# Patient Record
Sex: MALE | Race: WHITE | URBAN - METROPOLITAN AREA
[De-identification: names, ages, dates, MRNs, and addresses within clinical notes are randomized per-mention and may not be internally consistent; named-entity substitution may affect disease eponyms.]

---

## 2017-03-26 ENCOUNTER — HOSPITAL ENCOUNTER (EMERGENCY)
Facility: CLINIC | Age: 27
Discharge: HOME | End: 2017-03-26
Attending: EMERGENCY MEDICINE | Admitting: EMERGENCY MEDICINE
Payer: OTHER GOVERNMENT

## 2017-03-26 VITALS
OXYGEN SATURATION: 98 % | DIASTOLIC BLOOD PRESSURE: 110 MMHG | HEART RATE: 68 BPM | TEMPERATURE: 98.4 F | RESPIRATION RATE: 16 BRPM | SYSTOLIC BLOOD PRESSURE: 167 MMHG | WEIGHT: 220 LBS | BODY MASS INDEX: 26.78 KG/M2

## 2017-03-26 DIAGNOSIS — K02.9 DENTAL CARIES: ICD-10-CM

## 2017-03-26 DIAGNOSIS — K08.89 PAIN, DENTAL: ICD-10-CM

## 2017-03-26 PROCEDURE — 99283 EMERGENCY DEPT VISIT LOW MDM: CPT | Mod: 25 | Performed by: EMERGENCY MEDICINE

## 2017-03-26 PROCEDURE — 99282 EMERGENCY DEPT VISIT SF MDM: CPT | Mod: 25

## 2017-03-26 PROCEDURE — 64400 NJX AA&/STRD TRIGEMINAL NRV: CPT | Performed by: EMERGENCY MEDICINE

## 2017-03-26 PROCEDURE — 64400 NJX AA&/STRD TRIGEMINAL NRV: CPT

## 2017-03-26 RX ORDER — INDOMETHACIN 50 MG/1
50 CAPSULE ORAL
Qty: 30 CAPSULE | Refills: 0 | Status: SHIPPED | OUTPATIENT
Start: 2017-03-26 | End: 2018-02-01

## 2017-03-26 NOTE — ED AVS SNAPSHOT
[facility]    [address] [address]    Phone:  [phone]    Fax:  [phone]                                       Jasper Rios   MRN: [id number]    Department:  [facility]   Date of Visit:  3/26/2017           After Visit Summary Signature Page     I have received my discharge instructions, and my questions have been answered. I have discussed any challenges I see with this plan with the nurse or doctor.    ..........................................................................................................................................  Patient/Patient Representative Signature      ..........................................................................................................................................  Patient Representative Print Name and Relationship to Patient    ..................................................               ................................................  Date                                            Time    ..........................................................................................................................................  Reviewed by Signature/Title    ...................................................              ..............................................  Date                                                            Time

## 2017-03-26 NOTE — ED AVS SNAPSHOT
[facility]    [address] [address]    Phone:  [phone]    Fax:  [phone]                                       Jasper Rios   MRN: [id number]    Department:  [facility]   Date of Visit:  3/26/2017           Patient Information     Date Of Birth          1990        Your diagnoses for this visit were:     Pain, dental     Dental caries        You were seen by Saravanan Garcia MD.      Follow-up Information     Follow up with Lynda Lagos APRN CNP.    Specialty:  Nurse Practitioner    Why:  As needed    Contact information:    [facility]  [address] [address]  [phone]          Discharge Instructions       Many of these clinics offer a sliding fee option for patients that qualify, and see patients on a walk-in or same day basis. Please call each clinic directly. As services, hours, fees and policies vary greatly.          [facility]  [phone]  Sees no insurance  [facility], [place]  [phone]  Preventive services only  [facility] (mult loc) [phone]  [facility]    ([facility]  [phone]  [facility]       [phone]  Preventive services only  [facility]  [phone]  Accepts MA & sees no ins  [facility],      Accepts MA & sees no ins   [place]   [phone]; [phone]  [facility]   Accepts MA & sees no ins       [phone]  [facility]  [phone]   Accepts MA emergencies  [facility] [phone]  [facility],     Accepts MA   [place]   [phone]    [facility] [phone]  Accepts MA & sees no ins   [facility]   Dental Clinic    [phone]  [facility]  [phone]    "Community Clinic [phone]  [facility]  Preventive services only   [place]   [phone]  [facility] [phone]  [facility], [place]  [phone]  Same day [facility] [phone]  Same day [facility],      Same day [facility]   [phone]    [facility], [facility] [phone]  Free clinic, walk-in only  [facility] (multiple locations) [phone]      [facility] [phone]    [facility] [phone]  Free [facility], walk-in only  [facility]  [phone]  [facility] [phone] (adults)       [phone] (children)  [facility] [phone]    Also, referral service for low cost dental and healthcare: [phone]  And [phone]-Dentist            Dental Cavity    A dental cavity is a pit or crater in the surface of a tooth. This exposes the sensitive inner layer of the tooth and causes pain. If the cavity isn t treated, it will get bigger. It may cause an infection or abscess in the root of the tooth. An infection in the tooth is a much more serious problem than a cavity. You might need a root canal or the entire tooth taken out.  The pain in your tooth may be made worse by drinking hot or cold beverages. It may spread from the tooth to your ear or the area of your jaw on the same side.  Home care  Follow these tips when caring for yourself at home:    Avoid hot and cold foods and drinks. Your tooth may be sensitive to changes in temperature.    If your tooth is chipped or cracked, or if there is a large open cavity, put oil of cloves directly on the tooth to relieve pain. You can buy oil of cloves at drugstores. Some pharmacies carry an over-the-counter \"toothache kit.\" This contains a paste that you can put on the exposed tooth to make it less " sensitive.    Put a cold pack on your jaw over the sore area to help reduce pain.    You may use acetaminophen or ibuprofen to ease pain, unless another medicine was prescribed. Note: If you have chronic liver or kidney disease, talk with your health care provider before using these medicines. Also talk with your provider if you ve had a stomach ulcer or GI bleeding.    If you have signs of an infection, you will be given an antibiotic. Take it as directed.  Follow-up care  Follow up with your dentist as advised. Your pain may go away with the treatment given today. But only a dentist can fully look at and treat this problem to prevent further tooth damage.  When to seek medical advice  Call your health care provider right away if any of these occur:    Redness or swelling of the face    Pain gets worse or spreads to your neck    Fever over 100.5  F (38 C)    Unusual drowsiness    Headache or stiff neck    Weakness or fainting    Pus drains from the tooth or gum    Difficulty swallowing or breathing       1048-9170 [employer]. [address] [place] [address]. All rights reserved. This information is not intended as a substitute for professional medical care. Always follow your healthcare professional's instructions.          24 Hour Appointment Hotline       To make an appointment at any [facility] clinic, call [phone] ([phone]). If you don't have a family doctor or clinic, we will help you find one. [facility] clinics are conveniently located to serve the needs of you and your family.             Review of your medicines      START taking        Dose / Directions Last dose taken    indomethacin 50 MG capsule   Commonly known as:  INDOCIN   Dose:  50 mg   Quantity:  30 capsule        Take 1 capsule (50 mg) by mouth 3 times daily (with meals) for 10 days   Refills:  0          Our records show that you are taking the medicines listed below. If these are incorrect, please call your  "family doctor or clinic.        Dose / Directions Last dose taken    cyclobenzaprine 10 MG tablet   Commonly known as:  FLEXERIL   Dose:  5-10 mg   Quantity:  30 tablet        Take 0.5-1 tablets (5-10 mg) by mouth 3 times daily as needed for muscle spasms   Refills:  1        ibuprofen 200 MG tablet   Commonly known as:  ADVIL/MOTRIN   Dose:  600 mg   Quantity:  100 tablet        Take 3 tablets (600 mg) by mouth every 4 hours as needed for mild pain or pain   Refills:  0        NO ACTIVE MEDICATIONS        .   Refills:  0        TYLENOL 325 MG tablet   Dose:  650 mg   Quantity:  100 tablet   Generic drug:  acetaminophen        Take 2 tablets (650 mg) by mouth every 6 hours as needed for mild pain   Refills:  0                Prescriptions were sent or printed at these locations (1 Prescription)                   [facility] [place] [id number] [facility]   [address] [address]    Telephone:  [phone]   Fax:  [phone]   Hours:                  E-Prescribed (1 of 1)         indomethacin (INDOCIN) 50 MG capsule                Orders Needing Specimen Collection     None      Pending Results     No orders found from 3/24/2017 to 3/27/2017.            Pending Culture Results     No orders found from 3/24/2017 to 3/27/2017.             Test Results from your hospital stay            Thank you for choosing [facility]       Thank you for choosing [facility] for your care. Our goal is always to provide you with excellent care. Hearing back from our patients is one way we can continue to improve our services. Please take a few minutes to complete the written survey that you may receive in the mail after you visit with us. Thank you!        [employer] Information     [employer] lets you send messages to your doctor, view your test results, renew your prescriptions, schedule appointments and more. To sign up, go to [email or web address] . Click on \"Log in\" on the left side of the screen, which " "will take you to the Welcome page. Then click on \"Sign up Now\" on the right side of the page.     You will be asked to enter the access code listed below, as well as some personal information. Please follow the directions to create your username and password.     Your access code is: [email or web address]  Expires: 2017 10:33 PM     Your access code will  in 90 days. If you need help or a new code, please call your [place] clinic or [phone].        Care EveryWhere ID     This is your Care EveryWhere ID. This could be used by other organizations to access your [facility] medical records  [id number]        After Visit Summary       This is your record. Keep this with you and show to your community pharmacist(s) and doctor(s) at your next visit.                  "

## 2017-03-27 NOTE — ED NOTES
C/o right side upper and lower molar pain. Slight right facial swelling.  Stated both back molars fractured.  Was supposed to have removed before leaving  but didn't happen.  Has been trying to wait until April first when insurance is available but pain getting worse

## 2017-03-27 NOTE — DISCHARGE INSTRUCTIONS
Many of these clinics offer a sliding fee option for patients that qualify, and see patients on a walk-in or same day basis. Please call each clinic directly. As services, hours, fees and policies vary greatly.          [facility], hospitals  [phone]  Sees no insurance  [facility], [place]  [phone]  Preventive services only  [facility] (mult loc) [phone]  [facility]    ([facility]), hospitals  [phone]  [facility], [place]       [phone]  Preventive services only  [facility]  [phone]  Accepts MA & sees no ins  [facility],      Accepts MA & sees no ins   [place]   [phone]; [phone]  [facility], [facility]   Accepts MA & sees no ins       [phone]  [facility], hospitals  [phone]   Accepts MA emergencies  [facility], [place] [phone]  [facility],     Accepts MA   [place]   [phone]    [facility] [phone]  Accepts MA & sees no ins   [facility]   Dental Clinic    [phone]  [facility], hospitals  [phone]   Critical access hospital [phone]  [facility]  Preventive services only   [place]   [phone]  [facility] and [facility] [phone]  [facility], [place]  [phone]  Same day [facility] [phone]  Same day [facility],      Same day [facility]   [phone]    [facility], hospitals [phone]  Free [facility], walk-in only  [facility] (multiple locations) [phone]      [facility] , hospitals [phone]    [facility] [phone]  Free clinic, walk-in only  [place] [facility]  [phone]  [facility] [phone] (adults)       [phone]  "(children)  [place] [facility], [place] [phone]    Also, referral service for low cost dental and healthcare: [phone]  And 1-787-Dentist            Dental Cavity    A dental cavity is a pit or crater in the surface of a tooth. This exposes the sensitive inner layer of the tooth and causes pain. If the cavity isn t treated, it will get bigger. It may cause an infection or abscess in the root of the tooth. An infection in the tooth is a much more serious problem than a cavity. You might need a root canal or the entire tooth taken out.  The pain in your tooth may be made worse by drinking hot or cold beverages. It may spread from the tooth to your ear or the area of your jaw on the same side.  Home care  Follow these tips when caring for yourself at home:    Avoid hot and cold foods and drinks. Your tooth may be sensitive to changes in temperature.    If your tooth is chipped or cracked, or if there is a large open cavity, put oil of cloves directly on the tooth to relieve pain. You can buy oil of cloves at [employer]. Some pharmacies carry an over-the-counter \"toothache kit.\" This contains a paste that you can put on the exposed tooth to make it less sensitive.    Put a cold pack on your jaw over the sore area to help reduce pain.    You may use acetaminophen or ibuprofen to ease pain, unless another medicine was prescribed. Note: If you have chronic liver or kidney disease, talk with your health care provider before using these medicines. Also talk with your provider if you ve had a stomach ulcer or GI bleeding.    If you have signs of an infection, you will be given an antibiotic. Take it as directed.  Follow-up care  Follow up with your dentist as advised. Your pain may go away with the treatment given today. But only a dentist can fully look at and treat this problem to prevent further tooth damage.  When to seek medical advice  Call your health care provider right away if any of these " occur:    Redness or swelling of the face    Pain gets worse or spreads to your neck    Fever over 100.5  F (38 C)    Unusual drowsiness    Headache or stiff neck    Weakness or fainting    Pus drains from the tooth or gum    Difficulty swallowing or breathing       2262-3982 [employer]. [address] [place] [address]. All rights reserved. This information is not intended as a substitute for professional medical care. Always follow your healthcare professional's instructions.

## 2017-03-27 NOTE — ED PROVIDER NOTES
History     Chief Complaint   Patient presents with     Dental Pain     states he chipped a tooth. now painful     HPI  Jasper Rios is a 26 year old male who presents with right upper dental pain.  Patient states he's had long-standing issues with dental caries and dental pain.  States he is supposed to have his teeth fixed before he left the  but that never happened.  He states his dental insurance does not begin until April 1 and he does have a dentist he can see thereafter.  He presents today with increasing pain.  Denies fever or chills.  No facial swelling.  No difficulty speech or swallowing.  No treatment prior to arrival.  Immunizations are up-to-date.    I have reviewed the Medications, Allergies, Past Medical and Surgical History, and Social History in the Epic system.    Review of Systems all other systems were reviewed and are negative.  Past Medical History:   Diagnosis Date     Lumbago      Overweight(278.02)      Unspecified conductive hearing loss      Unspecified otitis media     recurrent     Patient Active Problem List   Diagnosis     MASS IN CHEST     Attention deficit disorder     CARDIOVASCULAR SCREENING; LDL GOAL LESS THAN 160     No current facility-administered medications for this encounter.      Current Outpatient Prescriptions   Medication     indomethacin (INDOCIN) 50 MG capsule     acetaminophen (TYLENOL) 325 MG tablet     ibuprofen (ADVIL,MOTRIN) 200 MG tablet     cyclobenzaprine (FLEXERIL) 10 MG tablet     NO ACTIVE MEDICATIONS        Allergies   Allergen Reactions     Penicillins      Social History     Social History     Marital status:      Spouse name: N/A     Number of children: N/A     Years of education: N/A     Occupational History     Not on file.     Social History Main Topics     Smoking status: Former Smoker     Packs/day: 0.50     Types: Cigarettes, Dip, chew, snus or snuff     Smokeless tobacco: Current User     Types: Chew     Alcohol use Yes       Comment: every other weekend     Drug use: No     Sexual activity: No     Other Topics Concern     Not on file     Social History Narrative     Family History   Problem Relation Age of Onset     Hypertension Mother      Lipids Father      DIABETES Paternal Grandmother      C.A.D. Paternal Grandfather      Respiratory Brother      Asthma Brother      GASTROINTESTINAL DISEASE Brother      chron's           Physical Exam   BP: (!) 167/110  Pulse: 68  Temp: 98.4  F (36.9  C)  Resp: 16  Weight: 99.8 kg (220 lb)  SpO2: 98 %  Physical Exam and alert cooperative male in moderate distress.  HEENT shows no obvious external facial swelling or asymmetry.  He does not have any malocclusion.  Examination of his teeth reveal multiple dental caries.  He has his #1 tooth extracted.  3 tooth is his offending tooth today.  This has been extensive caries in the medial aspect and is missing approximately one third of the tooth.  There is no erythema, fluctuance or tenderness of the gumline.    ED Course     ED Course     Procedures       patient was given a dental block of 0.5% Marcaine with epinephrine.  Total of 2 cc.       Critical Care time:  none               Labs Ordered and Resulted from Time of ED Arrival Up to the Time of Departure from the ED - No data to display    Assessments & Plan (with Medical Decision Making)   Patient is a 26-year-old male presents with worsening dental pain.  Patient has long history of dental issues and due to insurance has not had these addressed yet.  He presents today with increasing pain in the right upper #3 tooth.  On exam he has no evidence of infection or abscess.  No facial swelling.  He has extensive cavity affecting #3 was affecting approximately one third of the tooth medially.  Patient was provided a dental block.  He'll be given Indocin for additional pain.  Handout for emergency dental providers is given.  Reasons to return for reassessment discussed.  I have reviewed the nursing  notes.    I have reviewed the findings, diagnosis, plan and need for follow up with the patient.    New Prescriptions    INDOMETHACIN (INDOCIN) 50 MG CAPSULE    Take 1 capsule (50 mg) by mouth 3 times daily (with meals) for 10 days       Final diagnoses:   Pain, dental   Dental caries       3/26/2017   [facility] EMERGENCY DEPARTMENT     Saravanan Garcia MD  03/26/17 9679

## 2018-01-28 ENCOUNTER — HOSPITAL ENCOUNTER (EMERGENCY)
Facility: CLINIC | Age: 28
Discharge: HOME | End: 2018-01-28
Attending: PHYSICIAN ASSISTANT | Admitting: PHYSICIAN ASSISTANT
Payer: OTHER GOVERNMENT

## 2018-01-28 VITALS
OXYGEN SATURATION: 96 % | WEIGHT: 240 LBS | BODY MASS INDEX: 29.21 KG/M2 | HEART RATE: 103 BPM | SYSTOLIC BLOOD PRESSURE: 149 MMHG | RESPIRATION RATE: 16 BRPM | TEMPERATURE: 98 F | DIASTOLIC BLOOD PRESSURE: 83 MMHG

## 2018-01-28 DIAGNOSIS — M54.42 ACUTE MIDLINE LOW BACK PAIN WITH LEFT-SIDED SCIATICA: Primary | ICD-10-CM

## 2018-01-28 PROCEDURE — 99213 OFFICE O/P EST LOW 20 MIN: CPT | Performed by: PHYSICIAN ASSISTANT

## 2018-01-28 PROCEDURE — G0463 HOSPITAL OUTPT CLINIC VISIT: HCPCS

## 2018-01-28 RX ORDER — PREDNISONE 20 MG/1
TABLET ORAL
Qty: 10 TABLET | Refills: 0 | Status: SHIPPED | OUTPATIENT
Start: 2018-01-28 | End: 2018-03-30

## 2018-01-28 RX ORDER — HYDROCODONE BITARTRATE AND ACETAMINOPHEN 5; 325 MG/1; MG/1
1-2 TABLET ORAL EVERY 4 HOURS PRN
Qty: 15 TABLET | Refills: 0 | Status: SHIPPED | OUTPATIENT
Start: 2018-01-28 | End: 2018-02-01

## 2018-01-28 ASSESSMENT — ENCOUNTER SYMPTOMS
GASTROINTESTINAL NEGATIVE: 1
NEUROLOGICAL NEGATIVE: 1
CARDIOVASCULAR NEGATIVE: 1
CONSTITUTIONAL NEGATIVE: 1
RESPIRATORY NEGATIVE: 1
BACK PAIN: 1

## 2018-01-28 NOTE — ED AVS SNAPSHOT
[facility] Emergency Department    [address] [facility] [address]    Phone:  [phone]    Fax:  [phone]                                       Jasper Rios   MRN: [id number]    Department:  [facility] Emergency Department   Date of Visit:  1/28/2018           Patient Information     Date Of Birth          1990        Your diagnoses for this visit were:     Acute midline low back pain with left-sided sciatica        You were seen by Palak Sargent PA-C.      Follow-up Information     Follow up with Lynda Lagos APRN CNP. Call in 5 days.    Specialty:  Nurse Practitioner    Why:  As needed, For persistent symptoms    Contact information:    [address] [place] [address]  [phone]          Follow up with [facility] Emergency Department.    Specialty:  EMERGENCY MEDICINE    Why:  As needed, If symptoms worsen    Contact information:    [address] [place] [id number] [phone]    Additional information:    The medical center is located at   [address] [place] [address] ([facility] [address]   [facility] [place] miles north   of [place]).      Discharge References/Attachments     BACK PAIN W/ SCIATICA (ENGLISH)      24 Hour Appointment Hotline       To make an appointment at any [facility] clinic, call [phone]). If you don't have a family doctor or clinic, we will help you find one. [facility] clinics are conveniently located to serve the needs of you and your family.          ED Discharge Orders     PHYSICAL THERAPY REFERRAL       *This therapy referral will be filtered to a centralized scheduling office at [facility] and the patient will receive a call to schedule an appointment at a [facility] location most convenient for them. *     [facility] provides Physical Therapy evaluation and treatment and many specialty services across the [facility] system.  If requesting a  "specialty program, please choose from the list below.    If you have not heard from the scheduling office within 2 business days, please call [phone] for all locations, with the exception of [place], please call [phone].  Treatment: Evaluation & Treatment  Special Instructions/Modalities: Please evaluated and treat  Special Programs: None    Please be aware that coverage of these services is subject to the terms and limitations of your health insurance plan.  Call member services at your health plan with any benefit or coverage questions.      **Note to Provider:  If you are referring outside of [facility] for the therapy appointment, please list the name of the location in the \"special instructions\" above, print the referral and give to the patient to schedule the appointment.                     Review of your medicines      START taking        Dose / Directions Last dose taken    HYDROcodone-acetaminophen 5-325 MG per tablet   Commonly known as:  NORCO   Dose:  1-2 tablet   Quantity:  15 tablet        Take 1-2 tablets by mouth every 4 hours as needed for moderate to severe pain   Refills:  0        predniSONE 20 MG tablet   Commonly known as:  DELTASONE   Quantity:  10 tablet        Take two tablets (= 40mg) each day for 5 (five) days   Refills:  0          Our records show that you are taking the medicines listed below. If these are incorrect, please call your family doctor or clinic.        Dose / Directions Last dose taken    cyclobenzaprine 10 MG tablet   Commonly known as:  FLEXERIL   Dose:  5-10 mg   Quantity:  30 tablet        Take 0.5-1 tablets (5-10 mg) by mouth 3 times daily as needed for muscle spasms   Refills:  1        ibuprofen 200 MG tablet   Commonly known as:  ADVIL/MOTRIN   Dose:  600 mg   Quantity:  100 tablet        Take 3 tablets (600 mg) by mouth every 4 hours as needed for mild pain or pain   Refills:  0        indomethacin 50 MG capsule   Commonly known as:  INDOCIN   Dose:  50 mg "   Quantity:  30 capsule        Take 1 capsule (50 mg) by mouth 3 times daily (with meals) for 10 days   Refills:  0        NO ACTIVE MEDICATIONS        .   Refills:  0        TYLENOL 325 MG tablet   Dose:  650 mg   Quantity:  100 tablet   Generic drug:  acetaminophen        Take 2 tablets (650 mg) by mouth every 6 hours as needed for mild pain   Refills:  0                Prescriptions were sent or printed at these locations (2 Prescriptions)                   [facility] [place], [place] - [address] [facility]   [address] [facility] [address]    Telephone:  [phone]   Fax:  [phone]   Hours:                  E-Prescribed (1 of 2)         predniSONE (DELTASONE) 20 MG tablet                 Printed at Department/Unit printer (1 of 2)         HYDROcodone-acetaminophen (NORCO) 5-325 MG per tablet                Orders Needing Specimen Collection     None      Pending Results     No orders found from 1/26/2018 to 1/29/2018.            Pending Culture Results     No orders found from 1/26/2018 to 1/29/2018.            Pending Results Instructions     If you had any lab results that were not finalized at the time of your Discharge, you can call the ED Lab Result RN at [phone]. You will be contacted by this team for any positive Lab results or changes in treatment. The nurses are available 7 days a week from 10A to 6:30P.  You can leave a message 24 hours per day and they will return your call.        Test Results From Your Hospital Stay               Thank you for choosing [facility]       Thank you for choosing [facility] for your care. Our goal is always to provide you with excellent care. Hearing back from our patients is one way we can continue to improve our services. Please take a few minutes to complete the written survey that you may receive in the mail after you visit with us. Thank you!        [facility] Information     [employer] lets you send messages to your doctor, view your test results,  "renew your prescriptions, schedule appointments and more. To sign up, go to [email or web address] . Click on \"Log in\" on the left side of the screen, which will take you to the Welcome page. Then click on \"Sign up Now\" on the right side of the page.     You will be asked to enter the access code listed below, as well as some personal information. Please follow the directions to create your username and password.     Your access code is: [email or web address]  Expires: 2018  1:37 PM     Your access code will  in 90 days. If you need help or a new code, please call your [facility] clinic or [phone].        Care EveryWhere ID     This is your Care EveryWhere ID. This could be used by other organizations to access your [facility] medical records  [id number]        Equal Access to Services     CHEN MANJARREZ : Michael Tervino, jhonatan christensen, riley guzman, dex guevara . So [facility] [phone].    ATENCIÓN: Si habla español, tiene a hooker disposición servicios gratuitos de asistencia lingüística. Llame al [phone].    We comply with applicable federal civil rights laws and [place] laws. We do not discriminate on the basis of race, color, national origin, age, disability, sex, sexual orientation, or gender identity.            After Visit Summary       This is your record. Keep this with you and show to your community pharmacist(s) and doctor(s) at your next visit.                  "

## 2018-01-28 NOTE — ED PROVIDER NOTES
History     Chief Complaint   Patient presents with     Back Pain     low back pain, lifting injury, acute on chronic     HPI  Jasper Rios is a 27 year old male who presents with complaints of low back pain since yesterday.  Pt reports that he was helping his mother move yesterday and lifted a couch up a couple flights of stairs.  He did not experience any back pain until he bent over last night to get his 10 month old out of his car seat.  Pt states he developed sudden-onset pain in his mid-low back at that time.  Pain has been persistent since.  He reports some radiating paresthesias down his left leg at first but none since.  Back pain is worse with bending, twisting, and walking.  Pt reports history of similar back pain when he had a disc herniation while in the .  He has tried taking Ibuprofen at home without improvement.  Denies saddle anesthesia, bowel or bladder incontinence, lower extremity numbness/tingling/weakness.  Gait is steady but guarded.  Denies fevers, chills, nausea, vomiting, abdominal pain, urinary symptoms, or leg pain/swelling.      Problem List:    Patient Active Problem List    Diagnosis Date Noted     CARDIOVASCULAR SCREENING; LDL GOAL LESS THAN 160 02/09/2012     Priority: Medium     Attention deficit disorder 12/05/2006     Priority: Medium     Problem list name updated by automated process. Provider to review       MASS IN CHEST 04/12/2006     Priority: Medium     Enlarging growth along left border of the sternum.  Previously noted right posterior rib hump consistant with rotational scoliosis but that is not seen now and the growth is enlarging.  Minimal tenderness.          Past Medical History:    Past Medical History:   Diagnosis Date     Lumbago      Overweight(278.02)      Unspecified conductive hearing loss      Unspecified otitis media        Past Surgical History:    Past Surgical History:   Procedure Laterality Date     SURGICAL HISTORY OF -       aspiration  arthorgram left hip       Family History:    Family History   Problem Relation Age of Onset     Hypertension Mother      Lipids Father      DIABETES Paternal Grandmother      C.A.D. Paternal Grandfather      Respiratory Brother      Asthma Brother      GASTROINTESTINAL DISEASE Brother      chron's       Social History:  Marital Status:   [2]  Social History   Substance Use Topics     Smoking status: Former Smoker     Packs/day: 0.50     Types: Cigarettes, Dip, chew, snus or snuff     Smokeless tobacco: Current User     Types: Chew     Alcohol use Yes      Comment: every other weekend        Medications:      predniSONE (DELTASONE) 20 MG tablet   HYDROcodone-acetaminophen (NORCO) 5-325 MG per tablet   indomethacin (INDOCIN) 50 MG capsule   acetaminophen (TYLENOL) 325 MG tablet   ibuprofen (ADVIL,MOTRIN) 200 MG tablet   cyclobenzaprine (FLEXERIL) 10 MG tablet   NO ACTIVE MEDICATIONS         Review of Systems   Constitutional: Negative.    Respiratory: Negative.    Cardiovascular: Negative.    Gastrointestinal: Negative.    Genitourinary: Negative.    Musculoskeletal: Positive for back pain.   Skin: Negative.    Neurological: Negative.    All other systems reviewed and are negative.      Physical Exam   BP: 149/83  Pulse: 103  Temp: 98  F (36.7  C)  Resp: 16  Weight: 108.9 kg (240 lb)  SpO2: 96 %      Physical Exam   Constitutional: He appears well-developed and well-nourished. No distress.   HENT:   Head: Normocephalic and atraumatic.   Eyes: Conjunctivae are normal.   Neck: Normal range of motion. Neck supple.   Cardiovascular: Normal rate, regular rhythm and normal heart sounds.    Pulmonary/Chest: Effort normal and breath sounds normal.   Abdominal: Soft. There is no tenderness.   Musculoskeletal:        Cervical back: Normal. He exhibits normal range of motion, no tenderness and no bony tenderness.        Thoracic back: Normal. He exhibits normal range of motion, no tenderness and no bony tenderness.         Lumbar back: He exhibits decreased range of motion, tenderness and bony tenderness.   Midline lumbar tenderness.  No paraspinal muscle tenderness.  Positive SLR on the right.   Neurological: He is alert. He has normal strength and normal reflexes. No sensory deficit. Coordination and gait normal.   Skin: Skin is warm and dry. No rash noted.       ED Course     ED Course     Procedures      Assessments & Plan (with Medical Decision Making)     DDx: Acute muscle strain, muscle spasm, herniated disc, cauda equina, spinal fracture, spinal stenosis, sciatica, degenerative disease, ligamentous injury, spondylolisthesis, epidural abscess, osteomyelitis, AAA, abdominal etiologies, nephrolithiasis, pyelonephritis     Pt is a 27 year old male who presents with complaints of low back pain since yesterday.  Pt reports that he was helping his mother move yesterday and lifted a couch up a couple flights of stairs.  He did not experience any back pain until he bent over last night to get his 10 month old out of his car seat.  Pt states he developed sudden-onset pain in his mid-low back at that time.  Pain has been persistent since.  He reports some radiating paresthesias down his left leg at first but none since.  Back pain is worse with bending, twisting, and walking.  Pt reports history of similar back pain when he had a disc herniation while in the .  Pt is afebrile on arrival.  Midline low back tenderness on exam with positive SLR on the right.  No evidence of cauda equina.  Denies saddle anesthesia, bowel or bladder incontinence, lower extremity numbness/tingling/weakness.  Normal lower extremity strength.  Gait is steady.  Suspect possible disc herniation.  No indication for emergent MRI imaging today as pt has no new trauma or neurologic symptoms or objective findings of weakness or signs of cauda equina on exam.  Encouraged symptomatic treatments at home.  Physical therapy referral was made if patient does not  experience improvement.  Hand-outs were provided.    Patient was sent with Prednisone burst and Norco and was instructed to follow-up with PCP if no improvement in 5-7 days for continued care and management or sooner if new or worsening symptoms.  He is to return to the ED for persistent and/or worsening symptoms.  Patient expressed understanding of the diagnosis and plan and was discharged home in good condition.    I have reviewed the nursing notes.    I have reviewed the findings, diagnosis, plan and need for follow up with the patient.    Discharge Medication List as of 1/28/2018  1:37 PM      START taking these medications    Details   predniSONE (DELTASONE) 20 MG tablet Take two tablets (= 40mg) each day for 5 (five) days, Disp-10 tablet, R-0, E-Prescribe      HYDROcodone-acetaminophen (NORCO) 5-325 MG per tablet Take 1-2 tablets by mouth every 4 hours as needed for moderate to severe pain, Disp-15 tablet, R-0, Local Print             Final diagnoses:   Acute midline low back pain with left-sided sciatica       1/28/2018   [facility] EMERGENCY DEPARTMENT     Palak Sargent PA-C  01/28/18 4471

## 2018-01-28 NOTE — ED AVS SNAPSHOT
[facility] Emergency Department    [address] [facility] [address]    Phone:  [phone]    Fax:  [phone]                                       Jasper Rios   MRN: [id number]    Department:  [facility] Emergency Department   Date of Visit:  1/28/2018           After Visit Summary Signature Page     I have received my discharge instructions, and my questions have been answered. I have discussed any challenges I see with this plan with the nurse or doctor.    ..........................................................................................................................................  Patient/Patient Representative Signature      ..........................................................................................................................................  Patient Representative Print Name and Relationship to Patient    ..................................................               ................................................  Date                                            Time    ..........................................................................................................................................  Reviewed by Signature/Title    ...................................................              ..............................................  Date                                                            Time

## 2018-01-28 NOTE — LETTER
[facility] EMERGENCY DEPARTMENT  [address] [facility] [address]  Phone: [phone]  Fax: [phone]    January 29, 2018        Jasper Rios  [address] [facility] [address]          To whom it may concern:    RE: Jasper Rios    Patient was seen and treated today at our clinic.  Patient can return to work on 1/29/18 with light duty for 1 week.     Please contact me for questions or concerns.      Sincerely,      Cheryl Dockery PA-C

## 2018-01-30 ENCOUNTER — HOSPITAL ENCOUNTER (OUTPATIENT)
Dept: PHYSICAL THERAPY | Facility: CLINIC | Age: 28
Setting detail: THERAPIES SERIES
End: 2018-01-30
Attending: PHYSICIAN ASSISTANT
Payer: OTHER GOVERNMENT

## 2018-01-30 PROCEDURE — 97014 ELECTRIC STIMULATION THERAPY: CPT | Mod: GP | Performed by: PHYSICAL THERAPIST

## 2018-01-30 PROCEDURE — 97110 THERAPEUTIC EXERCISES: CPT | Mod: GP | Performed by: PHYSICAL THERAPIST

## 2018-01-30 PROCEDURE — 40000718 ZZHC STATISTIC PT DEPARTMENT ORTHO VISIT: Performed by: PHYSICAL THERAPIST

## 2018-01-30 NOTE — PROGRESS NOTES
01/30/18 0700   General Information   Type of Visit Initial OP Ortho PT Evaluation   Start of Care Date 01/30/18   Referring Physician Palak Sargent PA-C   Patient/Family Goals Statement Improve mobility, decrease pain   Orders Evaluate and Treat   Date of Order 01/28/18   Insurance Type Other  ()   Medical Diagnosis LBP w/ L sciatica   Body Part(s)   Body Part(s) Lumbar Spine/SI   Presentation and Etiology   Pertinent history of current problem (include personal factors and/or comorbidities that impact the POC) Pt was helping mom move on 1/27/18--no pain during.  Pt states later that day when he bent over to get son out of car seat which was resting on the floor his back locked up and he could not stand up.   Pt went to ER the following day.  No recent tests.  Meds: prednisone,  hydrocodone--primarily using at night.   During the day taking alleve, ibuprofen or tylenol.    Pt reports midline LBP @ best 4/10 w/ meds,  @ worst 10/10.  No radiating pain.   Pt notes intermittent numbness/tingling in L > R groin into leg and post knee.   No LE weakness.   Pt notes initially less frequent bowel / bladder.   PMHX:  slipped disc upper thoracic in .  Lumbar mm spasms intermittently.   13 broken bones (none recently).  L knee meniscectomy 2009.      Impairments A. Pain;D. Decreased ROM;E. Decreased flexibility;F. Decreased strength and endurance;H. Impaired gait;L. Tingling   Onset date of current episode/exacerbation 01/27/18   Pain quality A. Sharp;C. Aching;E. Shooting   Pain exacerbation comment Walking--having to use a cane.  Sitting tolerance 30 min.  Avoiding all lifting/ carrying.  Standing > 1 hour.  Bending--needs assistance w/ shoes/ socks.  Needs to take pain meds before bed  and wakes 2 X/night --takes 30 min to get back to sleep.  Difficulty getting out of bed takes 30 min in the morning   Pain/symptoms eased by C. Rest;F. Certain positions;G. Heat;H. Cold;I. OTC medication(s);J.  Braces/supports   Progression of symptoms since onset: Unchanged   Prior Level of Function   Functional Level Prior Comment No device w/ walking.  No limitations w/ lifting / carry.  Pt goes to gym 5X/ wk aerobic/ free wts.     Current Level of Function   Patient role/employment history A. Employed   Employment Comments calibration of equipment--travels .   Currently not doing any lifting.    Fall Risk Screen   Fall screen completed by PT   Have you fallen 2 or more times in the past year? No   Have you fallen and had an injury in the past year? No   Is patient a fall risk? No   Lumbar Spine/SI Objective Findings   Observation slow and guarded w/ mobility--including bed mobility   Posture R PSIS/ crest lower.  L LE longer supine   Gait/Locomotion Walking w/ SEC w/ mild + limp   Flexion ROM 40% leaning on cane for support then notes tightness   Extension ROM 10%   Right Side Bending ROM 40%   Left Side Bending ROM 30%   Pelvic Screen + R FB test (very limited ROM)   Hip Flexion (L2) Strength B 4-/5   Knee Flexion Strength B 5/5   Knee Extension (L3) Strength B 5/5   Ankle Dorsiflexion (L4) Strength B 5/5   Great Toe Extension (L5) Strength B 5/5   SLR B very limited motion before symptoms came on.     Slump Test B +   Segmental Mobility ERSR L5.    Palpation Mild tendenress lumbar paraspinals.     Planned Therapy Interventions   Planned Therapy Interventions manual therapy;neuromuscular re-education;ROM;strengthening;stretching   Planned Modality Interventions   Planned Modality Interventions Electrical stimulation;TENS   Clinical Impression   Criteria for Skilled Therapeutic Interventions Met yes, treatment indicated   PT Diagnosis acute LBP w/ radicular symptoms   Influenced by the following impairments pain, decreased mobility, decreased strength   Functional limitations due to impairments walking, sitting, lifting/ carrying, dressing, sleeping   Clinical Presentation Evolving/Changing   Clinical Presentation  Rationale recent onset of symptoms   Clinical Decision Making (Complexity) Moderate complexity   Therapy Frequency 2 times/Week   Predicted Duration of Therapy Intervention (days/wks) 4 weeks then 1 X / wk x 2 = 10 visits   Risk & Benefits of therapy have been explained Yes   Patient, Family & other staff in agreement with plan of care Yes   Education Assessment   Barriers to Learning No barriers   Ortho Goal 1   Goal Description 1.  Pt will be able to walk w/o device and no limp   Target Date 03/01/18   Ortho Goal 2   Goal Description 2.  Pt will be able to put on shoes /socks w/ LBP no > 3/10   Target Date 03/01/18   Ortho Goal 3   Goal Description 3.  Pt will have increased bed mobility and take no > 5 min to get out of bed in the morning   Target Date 03/21/18   Ortho Goal 4   Goal Description 4.  Pt will be able to lift/ carry 20# w/ LBP no > 3/10   Target Date 03/21/18   Ortho Goal 5   Goal Description 5.  Pt will be independent and consistent w/HEP and increased awareness of body mechanics   Target Date 03/21/18   Total Evaluation Time   Total Evaluation Time 30     Thank you for this referral,    Aydee Mora, PT,  CEAS   [id number]  [facility]  [phone]

## 2018-02-01 ENCOUNTER — OFFICE VISIT (OUTPATIENT)
Dept: FAMILY MEDICINE | Facility: CLINIC | Age: 28
End: 2018-02-01
Payer: OTHER GOVERNMENT

## 2018-02-01 VITALS
DIASTOLIC BLOOD PRESSURE: 80 MMHG | BODY MASS INDEX: 29.71 KG/M2 | RESPIRATION RATE: 14 BRPM | WEIGHT: 244 LBS | TEMPERATURE: 96.9 F | SYSTOLIC BLOOD PRESSURE: 134 MMHG | HEART RATE: 106 BPM | HEIGHT: 76 IN

## 2018-02-01 DIAGNOSIS — M54.42 ACUTE MIDLINE LOW BACK PAIN WITH LEFT-SIDED SCIATICA: Primary | ICD-10-CM

## 2018-02-01 PROCEDURE — 99213 OFFICE O/P EST LOW 20 MIN: CPT | Performed by: FAMILY MEDICINE

## 2018-02-01 RX ORDER — CYCLOBENZAPRINE HCL 10 MG
5-10 TABLET ORAL 3 TIMES DAILY PRN
Qty: 30 TABLET | Refills: 1 | Status: SHIPPED | OUTPATIENT
Start: 2018-02-01

## 2018-02-01 RX ORDER — HYDROCODONE BITARTRATE AND ACETAMINOPHEN 5; 325 MG/1; MG/1
1 TABLET ORAL EVERY 4 HOURS PRN
Qty: 30 TABLET | Refills: 0 | Status: SHIPPED | OUTPATIENT
Start: 2018-02-01 | End: 2018-03-30

## 2018-02-01 NOTE — MR AVS SNAPSHOT
"              After Visit Summary   2/1/2018    Jasper Rios    MRN: [id number]           Patient Information     Date Of Birth          1990        Visit Information        Provider Department      2/1/2018 7:00 AM Fabricio Siddiqui MD [facility]        Today's Diagnoses     Acute midline low back pain with left-sided sciatica    -  1      Care Instructions    Take Hydrocodone-Acetaminophen 5/325 mg 1 tablet orally every 4-6 hrs as needed for severe pain only.  Take cyclobenzaprine 10 mg 0.5-1 tablet orally every 8 hrs as needed for spasms.  Do not take the above medications at same time.  Ibuprofen 200 mg 2-3 tablets with food every 8 hrs as needed for pain  Do not take when driving or operating heavy equipment.    May use TENS unit as directed by therapist.  Continue physical therapy.    If constant left leg tingling, or if worsened pain after 1-2 weeks, contact your care team - possible MRI then.    Go to ER if with incontinence, urinary/bowel retention, weakness of legs or numbness of the buttocks.    * Sciatica    Sciatica (\"Lumbar Radiculopathy\") causes a pain that spreads from the lower back down into the buttock, hip and leg. Sometimes leg pain can occur without any back pain. Sciatica is due to irritation or pressure on a spinal nerve as it comes out of the spinal canal. This is most often due to a bulge or rupture of a nearby spinal disk (the cartilage cushion between each spinal bone), which presses on a nearby nerve. Other causes include spinal stenosis (narrowing of the spinal canal) and spasm of the piriformis muscle (a muscle in the buttocks that the sciatic nerve passes through).  Sciatica may begin after a sudden twisting/bending force (such as in a car accident), or sometimes after a simple awkward movement. In either case, muscle spasm is commonly present and contributes to the pain.  The diagnosis of sciatica is made from the symptoms and physical exam. Unless you " had a physical injury (such as a car accident or fall), X-rays are usually not ordered for the initial evaluation of sciatica because the nerves and disks cannot be seen on an X-ray. Most sciatica (80-90%) gets better with time.  What can I do about my low back pain?  There are three main things you can do to ease low back pain and help it go away.    Use heat or cold packs.    Take medicine as directed.    Use positions, movements and exercises. Stay active! Too much rest can make your symptoms worse.  Using heat or cold packs  Try cold packs or gentle heat to ease your pain. Use whichever gives the most relief. Apply the cold pack or heat for 15 minutes at a time, as often as needed.  Taking medicine  If taking over-the-counter medicine:    Take ibuprofen (Advil, Motrin) 600 mg. three times a day as needed for pain.  OR    Take Aleve (naproxen sodium) 220 to 440 mg. two times a day as needed for pain  If your doctor prescribed a muscle relaxant (cyclobenzapine 10 mg.):    Take one half ( ) to 1 tablet at bedtime    Do not drive when taking this medicine. This drug may make you sleepy.  Using positions, movements and exercises  Research tells us that moving your joints and muscles can help you recover from back pain. Such activity should be simple and gentle.  Use the positions below as well as walking to help relieve your discomfort. Try taking a short walk every 3 to 4 hours during the day. Walk for a few minutes inside your home or take longer walks outside, on a treadmill or at a mall. Slowly increase the amount of time you walk. Expect discomfort when you begin, but it should lessen as your back starts to recover.  Finding a position that is comfortable  When your back pain is new, you may find that certain positions will ease your pain. Gently try each of the following positions until you find one that eases your pain. Once you find a position of comfort, use it as often as you like while you recover. Return  to your daily routine as soon as possible.     Lie on your back with your legs bent. You can do this by placing a pillow under your knees or lie on the floor and rest your lower legs on the seat of a chair.    Lie on your side with your knees bent and place a pillow between your knees.    Lie on your stomach over pillows.  When should I call my doctor?  Your back pain should improve over the first couple of weeks. As it improves, you should be able to return to your normal activities. But call your doctor if:    You have a sudden change in your ability to control? your bladder or bowels.    You begin to feel tingling in your groin or legs.    The pain spreads down your leg and into your foot.    Your toes, feet or leg muscles begin to feel weak.    You feel generally unwell or sick.    Your pain gets worse.    3513-5737 [employer]. [address] [place] [address]. All rights reserved. This information is not intended as a substitute for professional medical care. Always follow your healthcare professional's instructions.  This information has been modified by your health care provider with permission from the publisher.                Follow-ups after your visit        Your next 10 appointments already scheduled     Feb 02, 2018  3:00 PM CST   Ortho Treatment with Aydee Mora PT   [facility] ([facility])    [address] [address]   [phone]            Feb 06, 2018  7:00 AM CST   Ortho Treatment with Aydee Mora PT   [facility])    [address] [address]   [phone]            Feb 09, 2018  7:00 AM CST   Ortho Treatment with Aydee Mora PT   [facility])    [address] [address]  [facility] [address]   [phone]            Feb 13, 2018  7:00 AM CST   Ortho Treatment  "with Aydee Mora, PT   [facility] ([facility])    [address]  [address]  [facility] [id number] [phone]            2018  7:00 AM CST   Ortho Treatment with Aydee Mora, PT   [facility] ([facility])    [address]  [address]  [facility] [place] [phone]              Who to contact     If you have questions or need follow up information about today's clinic visit or your schedule please contact [facility] directly at [phone].  Normal or non-critical lab and imaging results will be communicated to you by MyChart, letter or phone within 4 business days after the clinic has received the results. If you do not hear from us within 7 days, please contact the clinic through MyChart or phone. If you have a critical or abnormal lab result, we will notify you by phone as soon as possible.  Submit refill requests through [employer] or call your pharmacy and they will forward the refill request to us. Please allow 3 business days for your refill to be completed.          Additional Information About Your Visit        [employer] Information     [employer] lets you send messages to your doctor, view your test results, renew your prescriptions, schedule appointments and more. To sign up, go to [email or web address] . Click on \"Log in\" on the left side of the screen, which will take you to the Welcome page. Then click on \"Sign up Now\" on the right side of the page.     You will be asked to enter the access code listed below, as well as some personal information. Please follow the directions to create your username and password.     Your access code is: [email or web address]  Expires: 2018  1:37 PM     Your access code will  in 90 days. If you need help or a new code, please call your [facility] clinic or [phone].        Care EveryWhere ID     This is your Care EveryWhere ID. This could be used by " "other organizations to access your [facility] medical records  [id number]        Your Vitals Were     Pulse Temperature Respirations Height BMI (Body Mass Index)       106 96.9  F (36.1  C) (Tympanic) 14 6' 4\" (1.93 m) 29.7 kg/m2        Blood Pressure from Last 3 Encounters:   02/01/18 134/80   01/28/18 149/83   03/26/17 (!) 167/110    Weight from Last 3 Encounters:   02/01/18 244 lb (110.7 kg)   01/28/18 240 lb (108.9 kg)   03/26/17 220 lb (99.8 kg)              Today, you had the following     No orders found for display         Today's Medication Changes          These changes are accurate as of 2/1/18  7:48 AM.  If you have any questions, ask your nurse or doctor.               Start taking these medicines.        Dose/Directions    cyclobenzaprine 10 MG tablet   Commonly known as:  FLEXERIL   Used for:  Acute midline low back pain with left-sided sciatica   Started by:  Fabricio Siddiqui MD        Dose:  5-10 mg   Take 0.5-1 tablets (5-10 mg) by mouth 3 times daily as needed for muscle spasms   Quantity:  30 tablet   Refills:  1         These medicines have changed or have updated prescriptions.        Dose/Directions    HYDROcodone-acetaminophen 5-325 MG per tablet   Commonly known as:  NORCO   This may have changed:  how much to take   Used for:  Acute midline low back pain with left-sided sciatica   Changed by:  Fabricio Siddiqui MD        Dose:  1 tablet   Take 1 tablet by mouth every 4 hours as needed for moderate to severe pain   Quantity:  30 tablet   Refills:  0            Where to get your medicines      These medications were sent to [facility] [place], [place] - [address]  [address] [address]     Phone:  [phone]     cyclobenzaprine 10 MG tablet         Some of these will need a paper prescription and others can be bought over the counter.  Ask your nurse if you have questions.     Bring a paper prescription for each of these medications     " HYDROcodone-acetaminophen 5-325 MG per tablet                Primary Care Provider Office Phone # Fax #    Lynda Lagos, APRN [phone] [address] [facility] [address]        Equal Access to Services     CHEN MANJARREZ : Hadii aad ku hadasho Soomaali, waaxda luqadaha, qaybta kaalmada adeegyada, waxay idiin hayaan adeeg khkatiash laadam garrido. So [facility] [phone].    ATENCIÓN: Si habla español, tiene a hooker disposición servicios gratuitos de asistencia lingüística. Llame al [phone].    We comply with applicable federal civil rights laws and [place] laws. We do not discriminate on the basis of race, color, national origin, age, disability, sex, sexual orientation, or gender identity.            Thank you!     Thank you for choosing [facility]  for your care. Our goal is always to provide you with excellent care. Hearing back from our patients is one way we can continue to improve our services. Please take a few minutes to complete the written survey that you may receive in the mail after your visit with us. Thank you!             Your Updated Medication List - Protect others around you: Learn how to safely use, store and throw away your medicines at www.disposemymeds.org.          This list is accurate as of 2/1/18  7:48 AM.  Always use your most recent med list.                   Brand Name Dispense Instructions for use Diagnosis    cyclobenzaprine 10 MG tablet    FLEXERIL    30 tablet    Take 0.5-1 tablets (5-10 mg) by mouth 3 times daily as needed for muscle spasms    Acute midline low back pain with left-sided sciatica       HYDROcodone-acetaminophen 5-325 MG per tablet    NORCO    30 tablet    Take 1 tablet by mouth every 4 hours as needed for moderate to severe pain    Acute midline low back pain with left-sided sciatica       ibuprofen 200 MG tablet    ADVIL/MOTRIN    100 tablet    Take 3 tablets (600 mg) by mouth every 4 hours as needed for mild pain or  pain        NO ACTIVE MEDICATIONS      .        predniSONE 20 MG tablet    DELTASONE    10 tablet    Take two tablets (= 40mg) each day for 5 (five) days        TYLENOL 325 MG tablet   Generic drug:  acetaminophen     100 tablet    Take 2 tablets (650 mg) by mouth every 6 hours as needed for mild pain

## 2018-02-01 NOTE — PROGRESS NOTES
SUBJECTIVE:   Jasper Rios is a 27 year old male who presents to clinic today for the following health issues:  Chief Complaint   Patient presents with     ER F/U     Pt her for recent e/r f/u.         ED/UC Followup:    Facility:  [facility]  Date of visit: 1/28/18  Reason for visit: low back pain  Current Status: same         Back Pain       Duration: 6 days ago        Specific cause: lifting, helped mom move last weekend, lots of heavy furniture, up to sets of stairs    Description:   Location of pain: low back middle, left leg will go numb and tingly  Character of pain: sharp, constant and waxing and waning  Pain radiation:radiates into the left leg, first couple days was also in right leg  New numbness or weakness in legs, not attributed to pain:  YES, see above    Intensity: Currently 6.5/10, At its worst 10/10    History:   Pain interferes with job: can work as long as he has help  History of back problems: slipped disc in upper back but none in lower back  Any previous MRI or X-rays: None  Sees a specialist for back pain:  No  Therapies tried without relief: tens unit, cold and heat    Alleviating factors:   Improved by: laying flat and opioids dull pain      Precipitating factors:  Worsened by: Lifting, Bending, Standing, Sitting and Walking for a long period of time    Functional and Psychosocial Screen (Dory STarT Back):      Most recent score:    DORY START BACK TOTAL SCORE 2/1/2018   Total Score (all 9) 6   Pt started PT yesterday.          Accompanying Signs & Symptoms:  Risk of Fracture:  None  Risk of Cauda Equina:  None  Risk of Infection:  None  Risk of Cancer:  None  Risk of Ankylosing Spondylitis:  Onset at age <35, male, AND morning back stiffness. no       Problem list and histories reviewed & adjusted, as indicated.  Additional history: as documented    Patient Active Problem List   Diagnosis     MASS IN CHEST     Attention deficit disorder     CARDIOVASCULAR SCREENING; LDL GOAL LESS THAN  160     Past Surgical History:   Procedure Laterality Date     SURGICAL HISTORY OF -       aspiration arthorgram left hip       Social History   Substance Use Topics     Smoking status: Former Smoker     Packs/day: 0.50     Types: Cigarettes, Dip, chew, snus or snuff     Smokeless tobacco: Current User     Types: Chew     Alcohol use Yes      Comment: every other weekend     Family History   Problem Relation Age of Onset     Hypertension Mother      Lipids Father      DIABETES Paternal Grandmother      C.A.D. Paternal Grandfather      Respiratory Brother      Asthma Brother      GASTROINTESTINAL DISEASE Brother      chron's         Current Outpatient Prescriptions   Medication Sig Dispense Refill     HYDROcodone-acetaminophen (NORCO) 5-325 MG per tablet Take 1 tablet by mouth every 4 hours as needed for moderate to severe pain 30 tablet 0     cyclobenzaprine (FLEXERIL) 10 MG tablet Take 0.5-1 tablets (5-10 mg) by mouth 3 times daily as needed for muscle spasms 30 tablet 1     predniSONE (DELTASONE) 20 MG tablet Take two tablets (= 40mg) each day for 5 (five) days 10 tablet 0     acetaminophen (TYLENOL) 325 MG tablet Take 2 tablets (650 mg) by mouth every 6 hours as needed for mild pain 100 tablet      ibuprofen (ADVIL,MOTRIN) 200 MG tablet Take 3 tablets (600 mg) by mouth every 4 hours as needed for mild pain or pain 100 tablet      NO ACTIVE MEDICATIONS .       Allergies   Allergen Reactions     Penicillins        Reviewed and updated as needed this visit by clinical staff  Tobacco  Allergies  Meds  Problems  Med Hx  Surg Hx  Fam Hx  Soc Hx        Reviewed and updated as needed this visit by Provider  Allergies  Meds  Problems         ROS:  C: NEGATIVE for fever, chills, or change in weight  I: NEGATIVE for worrisome rashes, moles or lesions  GI: NEGATIVE for nausea, abdominal pain, heartburn, or change in bowel habits  : NEGATIVE for frequency, dysuria, or hematuria  MUSCULOSKELETAL: see above  N:  "NEGATIVE for weakness, dizziness or paresthesias  H: NEGATIVE for bleeding problems    OBJECTIVE:                                                    /80  Pulse 106  Temp 96.9  F (36.1  C) (Tympanic)  Resp 14  Ht 6' 4\" (1.93 m)  Wt 244 lb (110.7 kg)  BMI 29.7 kg/m2  Body mass index is 29.7 kg/(m^2).  GENERAL:  alert and no distress, uncomfortable getting up from chair, ambulatory with cane and some antalgia  NECK: no tenderness, no adenopathy, no asymmetry, no masses, no stiffness  MS: extremities- no gross deformities noted, no edema  SKIN: no suspicious lesions, no rashes  NEURO: strength and tone- normal, sensory exam- grossly normal, reflexes- symmetric  BACK: normal curvature, no deformity, no skin changes, moderate midline spinal TTP midlumbar level, no other TTP but with palpable muscle spasms lumbar paravertebral muscles, range of motion slightly limited by pain, SLR negative bilaterally today    Diagnostic test results:  Diagnostic Test Results:  none      ASSESSMENT/PLAN:                                                        ICD-10-CM    1. Acute midline low back pain with left-sided sciatica M54.42 HYDROcodone-acetaminophen (NORCO) 5-325 MG per tablet     cyclobenzaprine (FLEXERIL) 10 MG tablet     No cauda equina red flags.  Discussed causes, course and possible treatments.  Discussed with patient may take gradual self-limited course, and no urgent indication for imaging at this time. Patient concurred.  Discussed use of the above medications including precautions.; patient concurred with meds.  Activity modification discussed.  Discussed symptomatic measures. Advised use of Aspercreme with lidocaine topical.  Return precautions discussed and given to patient.  Follow up with Provider - 1-2 weeks if worsening     Patient Instructions   Take Hydrocodone-Acetaminophen 5/325 mg 1 tablet orally every 4-6 hrs as needed for severe pain only.  Take cyclobenzaprine 10 mg 0.5-1 tablet orally every 8 " "hrs as needed for spasms.  Do not take the above medications at same time.  Ibuprofen 200 mg 2-3 tablets with food every 8 hrs as needed for pain  Do not take when driving or operating heavy equipment.    May use TENS unit as directed by therapist.  Continue physical therapy.    If constant left leg tingling, or if worsened pain after 1-2 weeks, contact your care team - possible MRI then.    Go to ER if with incontinence, urinary/bowel retention, weakness of legs or numbness of the buttocks.    * Sciatica    Sciatica (\"Lumbar Radiculopathy\") causes a pain that spreads from the lower back down into the buttock, hip and leg. Sometimes leg pain can occur without any back pain. Sciatica is due to irritation or pressure on a spinal nerve as it comes out of the spinal canal. This is most often due to a bulge or rupture of a nearby spinal disk (the cartilage cushion between each spinal bone), which presses on a nearby nerve. Other causes include spinal stenosis (narrowing of the spinal canal) and spasm of the piriformis muscle (a muscle in the buttocks that the sciatic nerve passes through).  Sciatica may begin after a sudden twisting/bending force (such as in a car accident), or sometimes after a simple awkward movement. In either case, muscle spasm is commonly present and contributes to the pain.  The diagnosis of sciatica is made from the symptoms and physical exam. Unless you had a physical injury (such as a car accident or fall), X-rays are usually not ordered for the initial evaluation of sciatica because the nerves and disks cannot be seen on an X-ray. Most sciatica (80-90%) gets better with time.  What can I do about my low back pain?  There are three main things you can do to ease low back pain and help it go away.    Use heat or cold packs.    Take medicine as directed.    Use positions, movements and exercises. Stay active! Too much rest can make your symptoms worse.  Using heat or cold packs  Try cold packs or " gentle heat to ease your pain. Use whichever gives the most relief. Apply the cold pack or heat for 15 minutes at a time, as often as needed.  Taking medicine  If taking over-the-counter medicine:    Take ibuprofen (Advil, Motrin) 600 mg. three times a day as needed for pain.  OR    Take Aleve (naproxen sodium) 220 to 440 mg. two times a day as needed for pain  If your doctor prescribed a muscle relaxant (cyclobenzapine 10 mg.):    Take one half ( ) to 1 tablet at bedtime    Do not drive when taking this medicine. This drug may make you sleepy.  Using positions, movements and exercises  Research tells us that moving your joints and muscles can help you recover from back pain. Such activity should be simple and gentle.  Use the positions below as well as walking to help relieve your discomfort. Try taking a short walk every 3 to 4 hours during the day. Walk for a few minutes inside your home or take longer walks outside, on a treadmill or at a mall. Slowly increase the amount of time you walk. Expect discomfort when you begin, but it should lessen as your back starts to recover.  Finding a position that is comfortable  When your back pain is new, you may find that certain positions will ease your pain. Gently try each of the following positions until you find one that eases your pain. Once you find a position of comfort, use it as often as you like while you recover. Return to your daily routine as soon as possible.     Lie on your back with your legs bent. You can do this by placing a pillow under your knees or lie on the floor and rest your lower legs on the seat of a chair.    Lie on your side with your knees bent and place a pillow between your knees.    Lie on your stomach over pillows.  When should I call my doctor?  Your back pain should improve over the first couple of weeks. As it improves, you should be able to return to your normal activities. But call your doctor if:    You have a sudden change in your  ability to control? your bladder or bowels.    You begin to feel tingling in your groin or legs.    The pain spreads down your leg and into your foot.    Your toes, feet or leg muscles begin to feel weak.    You feel generally unwell or sick.    Your pain gets worse.    9744-2807 [employer]. [address] [place] [address], [place] [address]. All rights reserved. This information is not intended as a substitute for professional medical care. Always follow your healthcare professional's instructions.  This information has been modified by your health care provider with permission from the publisher.            Fabricio Siddiqui MD  [facility]

## 2018-02-01 NOTE — NURSING NOTE
"Chief Complaint   Patient presents with     ER F/U     Pt her for recent e/r f/u.       Initial /84  Pulse 106  Temp 96.9  F (36.1  C) (Tympanic)  Ht 6' 4\" (1.93 m)  Wt 244 lb (110.7 kg)  BMI 29.7 kg/m2 Estimated body mass index is 29.7 kg/(m^2) as calculated from the following:    Height as of this encounter: 6' 4\" (1.93 m).    Weight as of this encounter: 244 lb (110.7 kg).  Medication Reconciliation: complete  Rashmi Hughes CMA    "

## 2018-02-01 NOTE — PATIENT INSTRUCTIONS
"Take Hydrocodone-Acetaminophen 5/325 mg 1 tablet orally every 4-6 hrs as needed for severe pain only.  Take cyclobenzaprine 10 mg 0.5-1 tablet orally every 8 hrs as needed for spasms.  Do not take the above medications at same time.  Ibuprofen 200 mg 2-3 tablets with food every 8 hrs as needed for pain  Do not take when driving or operating heavy equipment.    May use TENS unit as directed by therapist.  Continue physical therapy.    If constant left leg tingling, or if worsened pain after 1-2 weeks, contact your care team - possible MRI then.    Go to ER if with incontinence, urinary/bowel retention, weakness of legs or numbness of the buttocks.    * Sciatica    Sciatica (\"Lumbar Radiculopathy\") causes a pain that spreads from the lower back down into the buttock, hip and leg. Sometimes leg pain can occur without any back pain. Sciatica is due to irritation or pressure on a spinal nerve as it comes out of the spinal canal. This is most often due to a bulge or rupture of a nearby spinal disk (the cartilage cushion between each spinal bone), which presses on a nearby nerve. Other causes include spinal stenosis (narrowing of the spinal canal) and spasm of the piriformis muscle (a muscle in the buttocks that the sciatic nerve passes through).  Sciatica may begin after a sudden twisting/bending force (such as in a car accident), or sometimes after a simple awkward movement. In either case, muscle spasm is commonly present and contributes to the pain.  The diagnosis of sciatica is made from the symptoms and physical exam. Unless you had a physical injury (such as a car accident or fall), X-rays are usually not ordered for the initial evaluation of sciatica because the nerves and disks cannot be seen on an X-ray. Most sciatica (80-90%) gets better with time.  What can I do about my low back pain?  There are three main things you can do to ease low back pain and help it go away.    Use heat or cold packs.    Take medicine " as directed.    Use positions, movements and exercises. Stay active! Too much rest can make your symptoms worse.  Using heat or cold packs  Try cold packs or gentle heat to ease your pain. Use whichever gives the most relief. Apply the cold pack or heat for 15 minutes at a time, as often as needed.  Taking medicine  If taking over-the-counter medicine:    Take ibuprofen (Advil, Motrin) 600 mg. three times a day as needed for pain.  OR    Take Aleve (naproxen sodium) 220 to 440 mg. two times a day as needed for pain  If your doctor prescribed a muscle relaxant (cyclobenzapine 10 mg.):    Take one half ( ) to 1 tablet at bedtime    Do not drive when taking this medicine. This drug may make you sleepy.  Using positions, movements and exercises  Research tells us that moving your joints and muscles can help you recover from back pain. Such activity should be simple and gentle.  Use the positions below as well as walking to help relieve your discomfort. Try taking a short walk every 3 to 4 hours during the day. Walk for a few minutes inside your home or take longer walks outside, on a treadmill or at a mall. Slowly increase the amount of time you walk. Expect discomfort when you begin, but it should lessen as your back starts to recover.  Finding a position that is comfortable  When your back pain is new, you may find that certain positions will ease your pain. Gently try each of the following positions until you find one that eases your pain. Once you find a position of comfort, use it as often as you like while you recover. Return to your daily routine as soon as possible.     Lie on your back with your legs bent. You can do this by placing a pillow under your knees or lie on the floor and rest your lower legs on the seat of a chair.    Lie on your side with your knees bent and place a pillow between your knees.    Lie on your stomach over pillows.  When should I call my doctor?  Your back pain should improve over the  first couple of weeks. As it improves, you should be able to return to your normal activities. But call your doctor if:    You have a sudden change in your ability to control? your bladder or bowels.    You begin to feel tingling in your groin or legs.    The pain spreads down your leg and into your foot.    Your toes, feet or leg muscles begin to feel weak.    You feel generally unwell or sick.    Your pain gets worse.    3952-0097 [employer]. [address] [place] [address]. All rights reserved. This information is not intended as a substitute for professional medical care. Always follow your healthcare professional's instructions.  This information has been modified by your health care provider with permission from the publisher.

## 2018-02-01 NOTE — LETTER
[facility]  [address] [address]  [phone]          February 1, 2018    RE:  Jasper Rios                                                                                                                                                       [address] [address] [place] [address]            To whom it may concern:    Jasper Rios is under my professional care for Acute midline low back pain with left-sided sciatica.    Due to significant pain and mobility difficulty from this, please excuse him from the drill on 2/3-4/2018.      Sincerely,        Fabricio Siddiqui MD

## 2018-02-01 NOTE — LETTER
[facility]  [address] [address]  [phone]          February 1, 2018    RE:  Jasper Rios                                                                                                                                                       [address] [address] [address]            To whom it may concern:    Jasper Rios is under my professional care for Acute midline low back pain with left-sided sciatica He  may return to work with the following: The employee is ABLE to return to work today.    When the patient returns to work, the following restrictions apply until 2/16/2018:  A) Bend: Not at all (0 hours)  B) Squat: Not at all (0 hours)  C) Walk/Stand: Occasionally (1-3 hours)  D) Reach Above Shoulders: Frequently (4-8 hours)  E) Lift, carry, push, and pull no more than:  0-10 lbs.      Sincerely,        Fabricio Siddiqui MD

## 2018-02-27 NOTE — ADDENDUM NOTE
Encounter addended by: Aydee Mora, PT on: 2/27/2018  7:29 AM<BR>     Actions taken: Sign clinical note, Flowsheet accepted, Episode resolved

## 2018-02-27 NOTE — PROGRESS NOTES
Discharge Note -Physical Therapy    NAME:  Jasper Rios  MRN:   [id number]    S:    Pt did not follow up for therapy as recommended.      O:  Objective information is not available as pt has not returned for therapy.  Initial evaluation note will serve as final entry.    A:   Pt did not return for further treatment.    Status of goals is unknown due to lack of followup by patient.    P:  Discharge from PT this date.    Thank you for this referral,    Aydee Mora, PT,  CEAS   #[id number]  [facility]  [phone]

## 2018-03-30 ENCOUNTER — HOSPITAL ENCOUNTER (EMERGENCY)
Facility: CLINIC | Age: 28
Discharge: HOME | End: 2018-03-30
Attending: NURSE PRACTITIONER | Admitting: NURSE PRACTITIONER
Payer: OTHER GOVERNMENT

## 2018-03-30 VITALS — TEMPERATURE: 98.3 F | SYSTOLIC BLOOD PRESSURE: 149 MMHG | DIASTOLIC BLOOD PRESSURE: 97 MMHG | HEART RATE: 97 BPM

## 2018-03-30 DIAGNOSIS — M54.50 ACUTE BILATERAL LOW BACK PAIN WITHOUT SCIATICA: Primary | ICD-10-CM

## 2018-03-30 DIAGNOSIS — M54.42 ACUTE MIDLINE LOW BACK PAIN WITH LEFT-SIDED SCIATICA: ICD-10-CM

## 2018-03-30 PROCEDURE — 99213 OFFICE O/P EST LOW 20 MIN: CPT | Performed by: NURSE PRACTITIONER

## 2018-03-30 PROCEDURE — G0463 HOSPITAL OUTPT CLINIC VISIT: HCPCS

## 2018-03-30 RX ORDER — HYDROCODONE BITARTRATE AND ACETAMINOPHEN 5; 325 MG/1; MG/1
1 TABLET ORAL EVERY 4 HOURS PRN
Qty: 30 TABLET | Refills: 0 | Status: SHIPPED | OUTPATIENT
Start: 2018-03-30

## 2018-03-30 RX ORDER — PREDNISONE 20 MG/1
TABLET ORAL
Qty: 10 TABLET | Refills: 0 | Status: SHIPPED | OUTPATIENT
Start: 2018-03-30 | End: 2018-06-09

## 2018-03-30 RX ORDER — TIZANIDINE 2 MG/1
2 TABLET ORAL 3 TIMES DAILY PRN
Qty: 30 TABLET | Refills: 0 | Status: SHIPPED | OUTPATIENT
Start: 2018-03-30

## 2018-03-30 ASSESSMENT — ENCOUNTER SYMPTOMS
EYE PAIN: 0
DIARRHEA: 0
BACK PAIN: 1
COUGH: 0
DYSURIA: 0
WOUND: 0
ABDOMINAL PAIN: 0
FATIGUE: 0
SORE THROAT: 0
CONSTIPATION: 0
DIFFICULTY URINATING: 0
SHORTNESS OF BREATH: 0
VOMITING: 0
NAUSEA: 0
FEVER: 0

## 2018-03-30 NOTE — ED AVS SNAPSHOT
[facility] Emergency Department    [address] [address]    Phone:  [phone]    Fax:  [phone]                                       Jasper Rios   MRN: [id number]    Department:  [facility] Emergency Department   Date of Visit:  3/30/2018           Patient Information     Date Of Birth          1990        Your diagnoses for this visit were:     Acute bilateral low back pain without sciatica     Acute midline low back pain with left-sided sciatica        You were seen by Halle Quiroz APRN CNP.      Follow-up Information     Follow up with Lynda Lagos APRN CNP In 1 week.    Specialty:  Nurse Practitioner    Why:  As needed, If symptoms worsen    Contact information:    [address] [address]  [phone]        Discharge References/Attachments     BACK EXERCISES, LUMBAR (ENGLISH)    BACK PAIN (ACUTE OR CHRONIC) (ENGLISH)    BACK PAIN, LOW: CAUSES OF LUMBAR (ENGLISH)      24 Hour Appointment Hotline       To make an appointment at any [facility] clinic, call [phone] ([phone]). If you don't have a family doctor or clinic, we will help you find one. [facility] clinics are conveniently located to serve the needs of you and your family.             Review of your medicines      START taking        Dose / Directions Last dose taken    tiZANidine 2 MG tablet   Commonly known as:  ZANAFLEX   Dose:  2 mg   Quantity:  30 tablet        Take 1 tablet (2 mg) by mouth 3 times daily as needed for muscle spasms   Refills:  0          Our records show that you are taking the medicines listed below. If these are incorrect, please call your family doctor or clinic.        Dose / Directions Last dose taken    cyclobenzaprine 10 MG tablet   Commonly known as:  FLEXERIL   Dose:  5-10 mg   Quantity:  30 tablet        Take 0.5-1 tablets (5-10 mg) by mouth 3 times daily as needed for muscle spasms   Refills:  1        HYDROcodone-acetaminophen  5-325 MG per tablet   Commonly known as:  NORCO   Dose:  1 tablet   Quantity:  30 tablet        Take 1 tablet by mouth every 4 hours as needed for moderate to severe pain   Refills:  0        ibuprofen 200 MG tablet   Commonly known as:  ADVIL/MOTRIN   Dose:  600 mg   Quantity:  100 tablet        Take 3 tablets (600 mg) by mouth every 4 hours as needed for mild pain or pain   Refills:  0        NO ACTIVE MEDICATIONS        .   Refills:  0        predniSONE 20 MG tablet   Commonly known as:  DELTASONE   Quantity:  10 tablet        Take two tablets (= 40mg) each day for 5 (five) days   Refills:  0        TYLENOL 325 MG tablet   Dose:  650 mg   Quantity:  100 tablet   Generic drug:  acetaminophen        Take 2 tablets (650 mg) by mouth every 6 hours as needed for mild pain   Refills:  0                Prescriptions were sent or printed at these locations (3 Prescriptions)                   [facility], [place] - [address]   [address] [address]    Telephone:  [phone]   Fax:  [phone]   Hours:                  E-Prescribed (2 of 3)         predniSONE (DELTASONE) 20 MG tablet               tiZANidine (ZANAFLEX) 2 MG tablet                 Printed at Department/Unit printer (1 of 3)         HYDROcodone-acetaminophen (NORCO) 5-325 MG per tablet                Orders Needing Specimen Collection     None      Pending Results     No orders found from 3/28/2018 to 3/31/2018.            Pending Culture Results     No orders found from 3/28/2018 to 3/31/2018.            Pending Results Instructions     If you had any lab results that were not finalized at the time of your Discharge, you can call the ED Lab Result RN at [phone]. You will be contacted by this team for any positive Lab results or changes in treatment. The nurses are available 7 days a week from 10A to 6:30P.  You can leave a message 24 hours per day and they will return your call.        Test Results From  "Your Hospital Stay               Thank you for choosing [facility]       Thank you for choosing [facility] for your care. Our goal is always to provide you with excellent care. Hearing back from our patients is one way we can continue to improve our services. Please take a few minutes to complete the written survey that you may receive in the mail after you visit with us. Thank you!        [facility] Information     [email or web address] lets you send messages to your doctor, view your test results, renew your prescriptions, schedule appointments and more. To sign up, go to [email or web address] . Click on \"Log in\" on the left side of the screen, which will take you to the Welcome page. Then click on \"Sign up Now\" on the right side of the page.     You will be asked to enter the access code listed below, as well as some personal information. Please follow the directions to create your username and password.     Your access code is: [email or web address]  Expires: 2018  2:37 PM     Your access code will  in 90 days. If you need help or a new code, please call your [facility] clinic or [phone].        Care EveryWhere ID     This is your Care EveryWhere ID. This could be used by other organizations to access your [facility] medical records  [id number]        Equal Access to Services     CHEN MANJARREZ : Michael venegaso Soruel, waaxda luqadaha, qaybta kaalmada adeegyada, dex garrido. So [facility] [phone].    ATENCIÓN: Si habla español, tiene a hooker disposición servicios gratuitos de asistencia lingüística. Llame al [phone].    We comply with applicable federal civil rights laws and [place] laws. We do not discriminate on the basis of race, color, national origin, age, disability, sex, sexual orientation, or gender identity.            After Visit Summary       This is your record. Keep this with you and show to your community pharmacist(s) and doctor(s) at your next visit.                  "

## 2018-03-30 NOTE — ED AVS SNAPSHOT
[facility] Emergency Department    [address] [address]    Phone:  [phone]    Fax:  [phone]                                       Jasper Rios   MRN: [id number]    Department:  [facility] Emergency Department   Date of Visit:  3/30/2018           After Visit Summary Signature Page     I have received my discharge instructions, and my questions have been answered. I have discussed any challenges I see with this plan with the nurse or doctor.    ..........................................................................................................................................  Patient/Patient Representative Signature      ..........................................................................................................................................  Patient Representative Print Name and Relationship to Patient    ..................................................               ................................................  Date                                            Time    ..........................................................................................................................................  Reviewed by Signature/Title    ...................................................              ..............................................  Date                                                            Time
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
## 2018-03-31 NOTE — DISCHARGE INSTRUCTIONS
Start prednisone 40 mg once daily for 5 days.  Take tizanidine 2 mg three times daily as needed for muscle spasms  Use ibuprofen 400-600 mg up to 4 times per day if needed for pain. Stop if it is causing nausea or abdominal pain.   Add Norco 5-325 (hydrocodone-acetaminophen) 1-2 pills up to every 4 hours if needed for pain. Do not use alcohol, operate machinery, drive, or climb on ladders for 8 hours after taking Norco. Use docusate (100mg) 2 times a day to prevent constipation while on narcotics.

## 2018-06-08 ENCOUNTER — HOSPITAL ENCOUNTER (EMERGENCY)
Facility: CLINIC | Age: 28
Discharge: HOME | End: 2018-06-09
Attending: FAMILY MEDICINE | Admitting: FAMILY MEDICINE
Payer: OTHER GOVERNMENT

## 2018-06-08 VITALS
BODY MASS INDEX: 28.25 KG/M2 | OXYGEN SATURATION: 100 % | SYSTOLIC BLOOD PRESSURE: 144 MMHG | WEIGHT: 232 LBS | DIASTOLIC BLOOD PRESSURE: 83 MMHG | HEIGHT: 76 IN | TEMPERATURE: 97.8 F | RESPIRATION RATE: 16 BRPM

## 2018-06-08 DIAGNOSIS — M54.50 ACUTE EXACERBATION OF CHRONIC LOW BACK PAIN: ICD-10-CM

## 2018-06-08 DIAGNOSIS — G89.29 ACUTE EXACERBATION OF CHRONIC LOW BACK PAIN: ICD-10-CM

## 2018-06-08 PROCEDURE — 99282 EMERGENCY DEPT VISIT SF MDM: CPT | Performed by: FAMILY MEDICINE

## 2018-06-08 PROCEDURE — 99284 EMERGENCY DEPT VISIT MOD MDM: CPT | Mod: Z6 | Performed by: FAMILY MEDICINE

## 2018-06-08 NOTE — ED AVS SNAPSHOT
[facility] Emergency Department    [address] [address]    Phone:  [phone]    Fax:  [phone]                                       Jasper Rios   MRN: [id number]    Department:  [facility] Emergency Department   Date of Visit:  6/8/2018           Patient Information     Date Of Birth          1990        Your diagnoses for this visit were:     Acute exacerbation of chronic low back pain        You were seen by Bryce Rasmussen MD.      Follow-up Information     Schedule an appointment as soon as possible for a visit with Lynda Lagos APRN CNP.    Specialty:  Nurse Practitioner    Why:  As needed, If symptoms worsen    Contact information:    [address] [facility] [id number] [phone]          Discharge Instructions       Back rest ×48 hours.  Rest ice affected areas.  Diclofenac 75 mg 2 times daily with food.  Vicodin sparingly for breakthrough pain.  No heavy lifting straining bending etc. over the next 7-10 days.  Daily ambulation flat level surface for 20-30 minutes.  If not improving please follow-up in clinic with your primary care provider.      24 Hour Appointment Hotline       To make an appointment at any [facility], call [phone] ([phone]). If you don't have a family doctor or clinic, we will help you find one. [place] clinics are conveniently located to serve the needs of you and your family.             Review of your medicines      START taking        Dose / Directions Last dose taken    diclofenac 75 MG EC tablet   Commonly known as:  VOLTAREN   Dose:  75 mg   Quantity:  30 tablet        Take 1 tablet (75 mg) by mouth 2 times daily as needed for moderate pain   Refills:  0          CONTINUE these medicines which may have CHANGED, or have new prescriptions. If we are uncertain of the size of tablets/capsules you have at home, strength may be listed as something that might have changed.        Dose /  Directions Last dose taken    * HYDROcodone-acetaminophen 5-325 MG per tablet   Commonly known as:  NORCO   Dose:  1 tablet   What changed:  Another medication with the same name was added. Make sure you understand how and when to take each.   Quantity:  30 tablet        Take 1 tablet by mouth every 4 hours as needed for moderate to severe pain   Refills:  0        * HYDROcodone-acetaminophen 5-325 MG per tablet   Commonly known as:  NORCO   Dose:  1 tablet   What changed:  You were already taking a medication with the same name, and this prescription was added. Make sure you understand how and when to take each.   Quantity:  10 tablet        Take 1 tablet by mouth every 4 hours as needed for pain   Refills:  0        * Notice:  This list has 2 medication(s) that are the same as other medications prescribed for you. Read the directions carefully, and ask your doctor or other care provider to review them with you.      Our records show that you are taking the medicines listed below. If these are incorrect, please call your family doctor or clinic.        Dose / Directions Last dose taken    cyclobenzaprine 10 MG tablet   Commonly known as:  FLEXERIL   Dose:  5-10 mg   Quantity:  30 tablet        Take 0.5-1 tablets (5-10 mg) by mouth 3 times daily as needed for muscle spasms   Refills:  1        ibuprofen 200 MG tablet   Commonly known as:  ADVIL/MOTRIN   Dose:  600 mg   Quantity:  100 tablet        Take 3 tablets (600 mg) by mouth every 4 hours as needed for mild pain or pain   Refills:  0        NO ACTIVE MEDICATIONS        .   Refills:  0        tiZANidine 2 MG tablet   Commonly known as:  ZANAFLEX   Dose:  2 mg   Quantity:  30 tablet        Take 1 tablet (2 mg) by mouth 3 times daily as needed for muscle spasms   Refills:  0        TYLENOL 325 MG tablet   Dose:  650 mg   Quantity:  100 tablet   Generic drug:  acetaminophen        Take 2 tablets (650 mg) by mouth every 6 hours as needed for mild pain   Refills:  0                 Information about OPIOIDS     PRESCRIPTION OPIOIDS: WHAT YOU NEED TO KNOW   You have a prescription for an opioid (narcotic) pain medicine. Opioids can cause addiction. If you have a history of chemical dependency of any type, you are at a higher risk of becoming addicted to opioids. Only take this medicine after all other options have been tried. Take it for as short a time and as few doses as possible.     Do not:    Drive. If you drive while taking these medicines, you could be arrested for driving under the influence (DUI).    Operate heavy machinery    Do any other dangerous activities while taking these medicines.     Drink any alcohol while taking these medicines.      Take with any other medicines that contain acetaminophen. Read all labels carefully. Look for the word  acetaminophen  or  Tylenol.  Ask your pharmacist if you have questions or are unsure.    Store your pills in a secure place, locked if possible. We will not replace any lost or stolen medicine. If you don t finish your medicine, please throw away (dispose) as directed by your pharmacist. The [place] [employer]ution [employer] has more information about safe disposal: https://www.pca.[facility].mn.us/living-green/managing-unwanted-medications    All opioids tend to cause constipation. Drink plenty of water and eat foods that have a lot of fiber, such as fruits, vegetables, prune juice, apple juice and high-fiber cereal. Take a laxative (Miralax, milk of magnesia, Colace, Senna) if you don t move your bowels at least every other day.         Prescriptions were sent or printed at these locations (2 Prescriptions)                   Other Prescriptions                Printed at Department/Unit printer (2 of 2)         diclofenac (VOLTAREN) 75 MG EC tablet               HYDROcodone-acetaminophen (NORCO) 5-325 MG per tablet                Orders Needing Specimen Collection     None      Pending Results     No orders found for last 3  "day(s).            Pending Culture Results     No orders found for last 3 day(s).            Pending Results Instructions     If you had any lab results that were not finalized at the time of your Discharge, you can call the ED Lab Result RN at [phone]. You will be contacted by this team for any positive Lab results or changes in treatment. The nurses are available 7 days a week from 10A to 6:30P.  You can leave a message 24 hours per day and they will return your call.        Test Results From Your Hospital Stay               Thank you for choosing [facility]       Thank you for choosing [facility] for your care. Our goal is always to provide you with excellent care. Hearing back from our patients is one way we can continue to improve our services. Please take a few minutes to complete the written survey that you may receive in the mail after you visit with us. Thank you!        [employer] Information     [employer] lets you send messages to your doctor, view your test results, renew your prescriptions, schedule appointments and more. To sign up, go to [email or web address] . Click on \"Log in\" on the left side of the screen, which will take you to the Welcome page. Then click on \"Sign up Now\" on the right side of the page.     You will be asked to enter the access code listed below, as well as some personal information. Please follow the directions to create your username and password.     Your access code is: [email or web address]  Expires: 2018 12:21 AM     Your access code will  in 90 days. If you need help or a new code, please call your [facility] clinic or [phone].        Care EveryWhere ID     This is your Care EveryWhere ID. This could be used by other organizations to access your [facility] medical records  [id number]        Equal Access to Services     CHEN MANJARREZ AH: jhonatan Owusu, dex nichole. So aggie " [phone].    ATENCIÓN: Si habla español, tiene a hooker disposición servicios gratuitos de asistencia lingüística. Llame al [phone].    We comply with applicable federal civil rights laws and [place] laws. We do not discriminate on the basis of race, color, national origin, age, disability, sex, sexual orientation, or gender identity.            After Visit Summary       This is your record. Keep this with you and show to your community pharmacist(s) and doctor(s) at your next visit.

## 2018-06-08 NOTE — ED AVS SNAPSHOT
[facility] Emergency Department    [address] [address]    Phone:  [phone]    Fax:  [phone]                                       Jasper Rios   MRN: [id number]    Department:  [facility] Emergency Department   Date of Visit:  6/8/2018           After Visit Summary Signature Page     I have received my discharge instructions, and my questions have been answered. I have discussed any challenges I see with this plan with the nurse or doctor.    ..........................................................................................................................................  Patient/Patient Representative Signature      ..........................................................................................................................................  Patient Representative Print Name and Relationship to Patient    ..................................................               ................................................  Date                                            Time    ..........................................................................................................................................  Reviewed by Signature/Title    ...................................................              ..............................................  Date                                                            Time

## 2018-06-09 RX ORDER — DICLOFENAC SODIUM 75 MG/1
75 TABLET, DELAYED RELEASE ORAL 2 TIMES DAILY PRN
Qty: 30 TABLET | Refills: 0 | Status: SHIPPED | OUTPATIENT
Start: 2018-06-09

## 2018-06-09 RX ORDER — HYDROCODONE BITARTRATE AND ACETAMINOPHEN 5; 325 MG/1; MG/1
1 TABLET ORAL EVERY 4 HOURS PRN
Qty: 10 TABLET | Refills: 0 | Status: SHIPPED | OUTPATIENT
Start: 2018-06-09

## 2018-06-09 NOTE — ED PROVIDER NOTES
History     Chief Complaint   Patient presents with     Back Pain     Started today/ past hx of back pain     HPI  Jasper Rios is a 27 year old male, past medical history is significant for ADD, presents to the emergency department with concerns of low back pain.  History is obtained from patient who states that he has previous history for back pain, he was driving home from [place] today essentially without stopping and balancing for some potholes and developed acute left-sided low back pain/tingling in the left low back.  There is no radicular component.  Not associated with bowel or bladder dysfunction.  He presents for evaluation and request for pain medication.  He has tried no pain medication thus far.      Problem List:    Patient Active Problem List    Diagnosis Date Noted     CARDIOVASCULAR SCREENING; LDL GOAL LESS THAN 160 02/09/2012     Priority: Medium     Attention deficit disorder 12/05/2006     Priority: Medium     Problem list name updated by automated process. Provider to review       MASS IN CHEST 04/12/2006     Priority: Medium     Enlarging growth along left border of the sternum.  Previously noted right posterior rib hump consistant with rotational scoliosis but that is not seen now and the growth is enlarging.  Minimal tenderness.          Past Medical History:    Past Medical History:   Diagnosis Date     Lumbago      Overweight(278.02)      Unspecified conductive hearing loss      Unspecified otitis media        Past Surgical History:    Past Surgical History:   Procedure Laterality Date     SURGICAL HISTORY OF -       aspiration arthorgram left hip       Family History:    Family History   Problem Relation Age of Onset     Hypertension Mother      Lipids Father      DIABETES Paternal Grandmother      C.A.D. Paternal Grandfather      Respiratory Brother      Asthma Brother      GASTROINTESTINAL DISEASE Brother      chron's       Social History:  Marital Status:    "[2]  Social History   Substance Use Topics     Smoking status: Former Smoker     Packs/day: 0.50     Types: Cigarettes, Dip, chew, snus or snuff     Smokeless tobacco: Current User     Types: Chew     Alcohol use Yes      Comment: every other weekend        Medications:      acetaminophen (TYLENOL) 325 MG tablet   diclofenac (VOLTAREN) 75 MG EC tablet   HYDROcodone-acetaminophen (NORCO) 5-325 MG per tablet   ibuprofen (ADVIL,MOTRIN) 200 MG tablet   cyclobenzaprine (FLEXERIL) 10 MG tablet   HYDROcodone-acetaminophen (NORCO) 5-325 MG per tablet   NO ACTIVE MEDICATIONS   tiZANidine (ZANAFLEX) 2 MG tablet         Review of Systems   All other systems reviewed and are negative.      Physical Exam   BP: 144/83  Heart Rate: 83  Temp: 97.8  F (36.6  C)  Resp: 16  Height: 193 cm (6' 4\")  Weight: 105.2 kg (232 lb)  SpO2: 100 %      Physical Exam   Constitutional: He is oriented to person, place, and time. He appears well-developed and well-nourished.   Musculoskeletal:        Back:    Neurological: He is alert and oriented to person, place, and time. He has normal reflexes.   Skin: Skin is warm and dry.   Nursing note and vitals reviewed.      ED Course     ED Course     Procedures               Critical Care time:  none               No results found for this or any previous visit (from the past 24 hour(s)).    Medications - No data to display    Assessments & Plan (with Medical Decision Making)   27-year-old male past medical history reviewed as above who presents with concerns of acute onset of back pain triggered by some irregularity of the road surface as described in the HPI.  Physical exam finds him alert no acute distress tenderness in the left SI joint area and without evidence of neurologic asymmetry on exam.  I suspect that this is musculoskeletal in origin recommended the additional use of NSAID class medication on a regular basis with sparing use of Vicodin for breakthrough pain.  Ice to the affected area and " return to ambulation on flat level surface after 48 hours of essentially back rest.  If not improving over the next 7-10 days to follow-up in clinic with his primary care provider.      Disclaimer: This note consists of symbols derived from keyboarding, dictation and/or voice recognition software. As a result, there may be errors in the script that have gone undetected. Please consider this when interpreting information found in this chart.      I have reviewed the nursing notes.    I have reviewed the findings, diagnosis, plan and need for follow up with the patient.       New Prescriptions    DICLOFENAC (VOLTAREN) 75 MG EC TABLET    Take 1 tablet (75 mg) by mouth 2 times daily as needed for moderate pain    HYDROCODONE-ACETAMINOPHEN (NORCO) 5-325 MG PER TABLET    Take 1 tablet by mouth every 4 hours as needed for pain       Final diagnoses:   Acute exacerbation of chronic low back pain       6/8/2018   [facility] EMERGENCY DEPARTMENT     Bryce Rasmussen MD  06/09/18 0023

## 2018-06-09 NOTE — DISCHARGE INSTRUCTIONS
Back rest ×48 hours.  Rest ice affected areas.  Diclofenac 75 mg 2 times daily with food.  Vicodin sparingly for breakthrough pain.  No heavy lifting straining bending etc. over the next 7-10 days.  Daily ambulation flat level surface for 20-30 minutes.  If not improving please follow-up in clinic with your primary care provider.

## 2024-12-16 ENCOUNTER — HOSPITAL ENCOUNTER (EMERGENCY)
Facility: CLINIC | Age: 34
Discharge: HOME | End: 2024-12-16

## 2024-12-16 VITALS
DIASTOLIC BLOOD PRESSURE: 89 MMHG | TEMPERATURE: 98.7 F | RESPIRATION RATE: 16 BRPM | OXYGEN SATURATION: 100 % | SYSTOLIC BLOOD PRESSURE: 136 MMHG | HEART RATE: 63 BPM

## 2024-12-16 DIAGNOSIS — S60.459A FOREIGN BODY IN SKIN OF FINGER, INITIAL ENCOUNTER: ICD-10-CM

## 2024-12-16 PROCEDURE — 10120 INC&RMVL FB SUBQ TISS SMPL: CPT | Mod: FA

## 2024-12-16 PROCEDURE — 99213 OFFICE O/P EST LOW 20 MIN: CPT | Mod: 25

## 2024-12-16 PROCEDURE — G0463 HOSPITAL OUTPT CLINIC VISIT: HCPCS | Mod: 25

## 2024-12-16 ASSESSMENT — COLUMBIA-SUICIDE SEVERITY RATING SCALE - C-SSRS
6. HAVE YOU EVER DONE ANYTHING, STARTED TO DO ANYTHING, OR PREPARED TO DO ANYTHING TO END YOUR LIFE?: NO
2. HAVE YOU ACTUALLY HAD ANY THOUGHTS OF KILLING YOURSELF IN THE PAST MONTH?: NO
1. IN THE PAST MONTH, HAVE YOU WISHED YOU WERE DEAD OR WISHED YOU COULD GO TO SLEEP AND NOT WAKE UP?: NO

## 2024-12-16 ASSESSMENT — ACTIVITIES OF DAILY LIVING (ADL): ADLS_ACUITY_SCORE: 41

## 2024-12-16 NOTE — DISCHARGE INSTRUCTIONS
Keep the wound clean and dry.  You can shower and wash it with soap and water.  No hot tubs, swimming or soaking until the wound is healed.  You can use Tylenol or ibuprofen for pain.  Return to the ER if you develop any fever, pus coming from the wound, severe pain or any other new or concerning symptoms.

## 2024-12-16 NOTE — ED TRIAGE NOTES
Pt reports work comp situation, staple to the left thumb  Incident happened today     Pt states 2016 TDAP while in

## 2024-12-16 NOTE — ED PROVIDER NOTES
History     Chief Complaint   Patient presents with    Work Comp    Foreign Body in Skin     HPI  Jasper Rios is a 34 year old male who presents urgent care with work comp injury.  Patient reports he was using a staple hammer when he accidentally stapled his left thumb.  Part of the stable embedded in his left thumbnail other and distal thumb.  Incident happened 2 hours prior to arrival.  Patient last Tdap was in 2016.  Denies decreased sensation of the finger.    Allergies:  Allergies   Allergen Reactions    Pcn [Penicillins]        Problem List:    Patient Active Problem List    Diagnosis Date Noted    CARDIOVASCULAR SCREENING; LDL GOAL LESS THAN 160 02/09/2012     Priority: Medium    Attention deficit disorder 12/05/2006     Priority: Medium     Problem list name updated by automated process. Provider to review      MASS IN CHEST 04/12/2006     Priority: Medium     Enlarging growth along left border of the sternum.  Previously noted right posterior rib hump consistant with rotational scoliosis but that is not seen now and the growth is enlarging.  Minimal tenderness.          Past Medical History:    Past Medical History:   Diagnosis Date    Lumbago     Overweight(278.02)     Unspecified conductive hearing loss     Unspecified otitis media        Past Surgical History:    Past Surgical History:   Procedure Laterality Date    SURGICAL HISTORY OF -       aspiration arthorgram left hip       Family History:    Family History   Problem Relation Age of Onset    Hypertension Mother     Lipids Father     Diabetes Paternal Grandmother     C.A.D. Paternal Grandfather     Respiratory Brother     Asthma Brother     Gastrointestinal Disease Brother         chron's       Social History:  Marital Status:   [2]  Social History     Tobacco Use    Smoking status: Former     Current packs/day: 0.50     Types: Cigarettes, Dip, chew, snus or snuff    Smokeless tobacco: Current     Types: Chew   Substance Use Topics     Alcohol use: Yes     Comment: every other weekend    Drug use: No        Medications:    acetaminophen (TYLENOL) 325 MG tablet  cyclobenzaprine (FLEXERIL) 10 MG tablet  diclofenac (VOLTAREN) 75 MG EC tablet  HYDROcodone-acetaminophen (NORCO) 5-325 MG per tablet  HYDROcodone-acetaminophen (NORCO) 5-325 MG per tablet  ibuprofen (ADVIL,MOTRIN) 200 MG tablet  NO ACTIVE MEDICATIONS  tiZANidine (ZANAFLEX) 2 MG tablet          Review of Systems   All other systems reviewed and are negative.      Physical Exam   BP: 136/89  Pulse: 63  Temp: 98.7  F (37.1  C)  Resp: 16  SpO2: 100 %      Physical Exam  Vitals and nursing note reviewed.   Constitutional:       General: He is not in acute distress.     Appearance: Normal appearance. He is not ill-appearing or toxic-appearing.   HENT:      Head: Normocephalic.   Cardiovascular:      Rate and Rhythm: Normal rate and regular rhythm.      Pulses: Normal pulses.      Heart sounds: Normal heart sounds.   Pulmonary:      Effort: Pulmonary effort is normal.   Skin:     Comments: Stable and left thumb.  Half of the stable is in the thumbnail other half in the thumb skin.   Neurological:      Mental Status: He is alert.   Psychiatric:         Mood and Affect: Mood normal.         Behavior: Behavior normal.         ED Course        Procedures    Staple remover used to remove staple today.  Patient tolerated this well.  Puncture wounds were thoroughly cleansed with Hibiclens and tap water.  Bacitracin ointment applied today with bandage.      No results found for this or any previous visit (from the past 24 hours).    Medications - No data to display    Assessments & Plan (with Medical Decision Making)     I have reviewed the nursing notes.    I have reviewed the findings, diagnosis, plan and need for follow up with the patient.      Medical Decision Making    Patient is a 34 year old male who presents urgent care with work comp injury.  Patient reports he was using a staple hammer when he  accidentally stapled his left thumb.  Part of the stable embedded in his left thumbnail other and distal thumb.  Incident happened 2 hours prior to arrival.  Patient last Tdap was in 2016.  Denies decreased sensation of the finger.    Exam above.  Significant for: Staple and left thumb.  Half of the stable is in the thumbnail other half in the thumb skin.    Procedure as stated above.  Staple removed with staple remover today.  Patient tolerated this well.  Wound was thoroughly cleansed with Hibiclens today.  Patient is up-to-date on Tdap.  Bacitracin ointment applied today.  Patient also given bacitracin to apply during bandage changes over the next 5 to 7 days.      Plan: Keep the wound clean and dry.  You can shower and wash it with soap and water.  No hot tubs, swimming or soaking until the wound is healed.  You can use Tylenol or ibuprofen for pain.  Return to the ER if you develop any fever, pus coming from the wound, severe pain or any other new or concerning symptoms.            Prior to making a final disposition on this patient the results of patient's tests and other diagnostic studies were discussed with the patient. All questions were answered. Patient expressed understanding of the plan and was amenable to it.     Disclaimer: This note consists of symbols derived from keyboarding, dictation and/or voice recognition software. As a result, there may be errors in the script that have gone undetected. Please consider this when interpreting information found in this chart.        Discharge Medication List as of 12/16/2024  2:30 PM          Final diagnoses:   Foreign body in skin of finger, initial encounter       12/16/2024   [facility] EMERGENCY DEPT       Yadira Uribe PA-C  12/16/24 8859

## 2024-12-23 ENCOUNTER — HOSPITAL ENCOUNTER (EMERGENCY)
Facility: CLINIC | Age: 34
Discharge: HOME | End: 2024-12-23

## 2024-12-23 ENCOUNTER — APPOINTMENT (OUTPATIENT)
Dept: GENERAL RADIOLOGY | Facility: CLINIC | Age: 34
End: 2024-12-23

## 2024-12-23 VITALS
TEMPERATURE: 98 F | OXYGEN SATURATION: 97 % | DIASTOLIC BLOOD PRESSURE: 83 MMHG | SYSTOLIC BLOOD PRESSURE: 136 MMHG | HEART RATE: 70 BPM

## 2024-12-23 DIAGNOSIS — M79.604 RIGHT LEG PAIN: ICD-10-CM

## 2024-12-23 PROCEDURE — 99213 OFFICE O/P EST LOW 20 MIN: CPT

## 2024-12-23 PROCEDURE — 73590 X-RAY EXAM OF LOWER LEG: CPT | Mod: RT

## 2024-12-23 PROCEDURE — G0463 HOSPITAL OUTPT CLINIC VISIT: HCPCS

## 2024-12-23 ASSESSMENT — ACTIVITIES OF DAILY LIVING (ADL): ADLS_ACUITY_SCORE: 41

## 2024-12-23 ASSESSMENT — COLUMBIA-SUICIDE SEVERITY RATING SCALE - C-SSRS
1. IN THE PAST MONTH, HAVE YOU WISHED YOU WERE DEAD OR WISHED YOU COULD GO TO SLEEP AND NOT WAKE UP?: NO
6. HAVE YOU EVER DONE ANYTHING, STARTED TO DO ANYTHING, OR PREPARED TO DO ANYTHING TO END YOUR LIFE?: NO
2. HAVE YOU ACTUALLY HAD ANY THOUGHTS OF KILLING YOURSELF IN THE PAST MONTH?: NO

## 2024-12-23 NOTE — Clinical Note
Jasper Rios was seen and treated in our emergency department on 12/23/2024.  He may return to work on 12/24/2024.  Activities as tolerated.  No restrictions.     If you have any questions or concerns, please don't hesitate to call.      Yadira Uribe PA-C

## 2024-12-29 ENCOUNTER — HEALTH MAINTENANCE LETTER (OUTPATIENT)
Age: 34
End: 2024-12-29
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
